# Patient Record
Sex: MALE | Race: OTHER | URBAN - METROPOLITAN AREA
[De-identification: names, ages, dates, MRNs, and addresses within clinical notes are randomized per-mention and may not be internally consistent; named-entity substitution may affect disease eponyms.]

---

## 2020-08-03 ENCOUNTER — EMERGENCY (EMERGENCY)
Facility: HOSPITAL | Age: 40
LOS: 1 days | Discharge: HOME | End: 2020-08-03
Attending: EMERGENCY MEDICINE | Admitting: EMERGENCY MEDICINE
Payer: MEDICAID

## 2020-08-03 VITALS
SYSTOLIC BLOOD PRESSURE: 128 MMHG | RESPIRATION RATE: 18 BRPM | OXYGEN SATURATION: 100 % | WEIGHT: 199.96 LBS | DIASTOLIC BLOOD PRESSURE: 79 MMHG | TEMPERATURE: 98 F | HEART RATE: 63 BPM

## 2020-08-03 VITALS
SYSTOLIC BLOOD PRESSURE: 130 MMHG | DIASTOLIC BLOOD PRESSURE: 80 MMHG | HEART RATE: 77 BPM | OXYGEN SATURATION: 100 % | RESPIRATION RATE: 18 BRPM | TEMPERATURE: 98 F

## 2020-08-03 LAB
ALBUMIN SERPL ELPH-MCNC: 4.3 G/DL — SIGNIFICANT CHANGE UP (ref 3.3–5)
ALP SERPL-CCNC: 71 U/L — SIGNIFICANT CHANGE UP (ref 40–120)
ALT FLD-CCNC: 11 U/L — SIGNIFICANT CHANGE UP (ref 10–45)
ANION GAP SERPL CALC-SCNC: 7 MMOL/L — SIGNIFICANT CHANGE UP (ref 5–17)
AST SERPL-CCNC: 17 U/L — SIGNIFICANT CHANGE UP (ref 10–40)
BASOPHILS # BLD AUTO: 0.04 K/UL — SIGNIFICANT CHANGE UP (ref 0–0.2)
BASOPHILS NFR BLD AUTO: 0.6 % — SIGNIFICANT CHANGE UP (ref 0–2)
BILIRUB SERPL-MCNC: 0.3 MG/DL — SIGNIFICANT CHANGE UP (ref 0.2–1.2)
BUN SERPL-MCNC: 9 MG/DL — SIGNIFICANT CHANGE UP (ref 7–23)
CALCIUM SERPL-MCNC: 9.3 MG/DL — SIGNIFICANT CHANGE UP (ref 8.4–10.5)
CHLORIDE SERPL-SCNC: 104 MMOL/L — SIGNIFICANT CHANGE UP (ref 96–108)
CO2 SERPL-SCNC: 30 MMOL/L — SIGNIFICANT CHANGE UP (ref 22–31)
CREAT SERPL-MCNC: 1 MG/DL — SIGNIFICANT CHANGE UP (ref 0.5–1.3)
EOSINOPHIL # BLD AUTO: 0.12 K/UL — SIGNIFICANT CHANGE UP (ref 0–0.5)
EOSINOPHIL NFR BLD AUTO: 1.7 % — SIGNIFICANT CHANGE UP (ref 0–6)
GLUCOSE SERPL-MCNC: 81 MG/DL — SIGNIFICANT CHANGE UP (ref 70–99)
HCT VFR BLD CALC: 44.3 % — SIGNIFICANT CHANGE UP (ref 39–50)
HGB BLD-MCNC: 14.4 G/DL — SIGNIFICANT CHANGE UP (ref 13–17)
IMM GRANULOCYTES NFR BLD AUTO: 0.1 % — SIGNIFICANT CHANGE UP (ref 0–1.5)
LYMPHOCYTES # BLD AUTO: 1.87 K/UL — SIGNIFICANT CHANGE UP (ref 1–3.3)
LYMPHOCYTES # BLD AUTO: 25.9 % — SIGNIFICANT CHANGE UP (ref 13–44)
MCHC RBC-ENTMCNC: 29.9 PG — SIGNIFICANT CHANGE UP (ref 27–34)
MCHC RBC-ENTMCNC: 32.5 GM/DL — SIGNIFICANT CHANGE UP (ref 32–36)
MCV RBC AUTO: 92.1 FL — SIGNIFICANT CHANGE UP (ref 80–100)
MONOCYTES # BLD AUTO: 0.63 K/UL — SIGNIFICANT CHANGE UP (ref 0–0.9)
MONOCYTES NFR BLD AUTO: 8.7 % — SIGNIFICANT CHANGE UP (ref 2–14)
NEUTROPHILS # BLD AUTO: 4.55 K/UL — SIGNIFICANT CHANGE UP (ref 1.8–7.4)
NEUTROPHILS NFR BLD AUTO: 63 % — SIGNIFICANT CHANGE UP (ref 43–77)
NRBC # BLD: 0 /100 WBCS — SIGNIFICANT CHANGE UP (ref 0–0)
PLATELET # BLD AUTO: 287 K/UL — SIGNIFICANT CHANGE UP (ref 150–400)
POTASSIUM SERPL-MCNC: 4.4 MMOL/L — SIGNIFICANT CHANGE UP (ref 3.5–5.3)
POTASSIUM SERPL-SCNC: 4.4 MMOL/L — SIGNIFICANT CHANGE UP (ref 3.5–5.3)
PROT SERPL-MCNC: 7.4 G/DL — SIGNIFICANT CHANGE UP (ref 6–8.3)
RBC # BLD: 4.81 M/UL — SIGNIFICANT CHANGE UP (ref 4.2–5.8)
RBC # FLD: 13.3 % — SIGNIFICANT CHANGE UP (ref 10.3–14.5)
SODIUM SERPL-SCNC: 141 MMOL/L — SIGNIFICANT CHANGE UP (ref 135–145)
WBC # BLD: 7.22 K/UL — SIGNIFICANT CHANGE UP (ref 3.8–10.5)
WBC # FLD AUTO: 7.22 K/UL — SIGNIFICANT CHANGE UP (ref 3.8–10.5)

## 2020-08-03 PROCEDURE — 80053 COMPREHEN METABOLIC PANEL: CPT

## 2020-08-03 PROCEDURE — 99284 EMERGENCY DEPT VISIT MOD MDM: CPT | Mod: 25

## 2020-08-03 PROCEDURE — 96375 TX/PRO/DX INJ NEW DRUG ADDON: CPT

## 2020-08-03 PROCEDURE — 36415 COLL VENOUS BLD VENIPUNCTURE: CPT

## 2020-08-03 PROCEDURE — 96374 THER/PROPH/DIAG INJ IV PUSH: CPT

## 2020-08-03 PROCEDURE — 85025 COMPLETE CBC W/AUTO DIFF WBC: CPT

## 2020-08-03 PROCEDURE — 99284 EMERGENCY DEPT VISIT MOD MDM: CPT

## 2020-08-03 RX ORDER — METOCLOPRAMIDE HCL 10 MG
10 TABLET ORAL ONCE
Refills: 0 | Status: COMPLETED | OUTPATIENT
Start: 2020-08-03 | End: 2020-08-03

## 2020-08-03 RX ORDER — KETOROLAC TROMETHAMINE 30 MG/ML
15 SYRINGE (ML) INJECTION ONCE
Refills: 0 | Status: DISCONTINUED | OUTPATIENT
Start: 2020-08-03 | End: 2020-08-03

## 2020-08-03 RX ORDER — SODIUM CHLORIDE 9 MG/ML
1000 INJECTION INTRAMUSCULAR; INTRAVENOUS; SUBCUTANEOUS ONCE
Refills: 0 | Status: COMPLETED | OUTPATIENT
Start: 2020-08-03 | End: 2020-08-03

## 2020-08-03 RX ADMIN — SODIUM CHLORIDE 1000 MILLILITER(S): 9 INJECTION INTRAMUSCULAR; INTRAVENOUS; SUBCUTANEOUS at 12:21

## 2020-08-03 RX ADMIN — Medication 15 MILLIGRAM(S): at 12:21

## 2020-08-03 RX ADMIN — Medication 10 MILLIGRAM(S): at 12:21

## 2020-08-03 NOTE — ED PROVIDER NOTE - NSFOLLOWUPINSTRUCTIONS_ED_ALL_ED_FT
Migraine Headache  A migraine headache is a very strong throbbing pain on one side or both sides of your head. This type of headache can also cause other symptoms. It can last from 4 hours to 3 days. Talk with your doctor about what things may bring on (trigger) this condition.  What are the causes?  The exact cause of this condition is not known. This condition may be triggered or caused by:  Drinking alcohol.Smoking.Taking medicines, such as:  Medicine used to treat chest pain (nitroglycerin).Birth control pills.Estrogen.Some blood pressure medicines.Eating or drinking certain products.Doing physical activity.Other things that may trigger a migraine headache include:  Having a menstrual period.Pregnancy.Hunger.Stress.Not getting enough sleep or getting too much sleep.Weather changes.Tiredness (fatigue).What increases the risk?  Being 25–55 years old.Being female.Having a family history of migraine headaches.Being .Having depression or anxiety.Being very overweight.What are the signs or symptoms?  A throbbing pain. This pain may:  Happen in any area of the head, such as on one side or both sides.Make it hard to do daily activities.Get worse with physical activity.Get worse around bright lights or loud noises.Other symptoms may include:  Feeling sick to your stomach (nauseous).Vomiting.Dizziness.Being sensitive to bright lights, loud noises, or smells.Before you get a migraine headache, you may get warning signs (an aura). An aura may include:  Seeing flashing lights or having blind spots.Seeing bright spots, halos, or zigzag lines.Having tunnel vision or blurred vision.Having numbness or a tingling feeling.Having trouble talking.Having weak muscles.Some people have symptoms after a migraine headache (postdromal phase), such as:  Tiredness.Trouble thinking (concentrating).How is this treated?  Taking medicines that:  Relieve pain.Relieve the feeling of being sick to your stomach.Prevent migraine headaches.Treatment may also include:  Having acupuncture.Avoiding foods that bring on migraine headaches.Learning ways to control your body functions (biofeedback).Therapy to help you know and deal with negative thoughts (cognitive behavioral therapy).Follow these instructions at home:  Medicines     Take over-the-counter and prescription medicines only as told by your doctor.Ask your doctor if the medicine prescribed to you:  Requires you to avoid driving or using heavy machinery.Can cause trouble pooping (constipation). You may need to take these steps to prevent or treat trouble pooping:  Drink enough fluid to keep your pee (urine) pale yellow.Take over-the-counter or prescription medicines.Eat foods that are high in fiber. These include beans, whole grains, and fresh fruits and vegetables.Limit foods that are high in fat and sugar. These include fried or sweet foods.Lifestyle     Do not drink alcohol.Do not use any products that contain nicotine or tobacco, such as cigarettes, e-cigarettes, and chewing tobacco. If you need help quitting, ask your doctor.Get at least 8 hours of sleep every night.Limit and deal with stress.General instructions         Keep a journal to find out what may bring on your migraine headaches. For example, write down:  What you eat and drink.How much sleep you get.Any change in what you eat or drink.Any change in your medicines.If you have a migraine headache:  Avoid things that make your symptoms worse, such as bright lights.It may help to lie down in a dark, quiet room.Do not drive or use heavy machinery.Ask your doctor what activities are safe for you.Keep all follow-up visits as told by your doctor. This is important.Contact a doctor if:  You get a migraine headache that is different or worse than others you have had.You have more than 15 headache days in one month.Get help right away if:  Your migraine headache gets very bad.Your migraine headache lasts longer than 72 hours.You have a fever.You have a stiff neck.You have trouble seeing.Your muscles feel weak or like you cannot control them.You start to lose your balance a lot.You start to have trouble walking.You pass out (faint).You have a seizure.Summary  A migraine headache is a very strong throbbing pain on one side or both sides of your head. These headaches can also cause other symptoms.This condition may be treated with medicines and changes to your lifestyle.Keep a journal to find out what may bring on your migraine headaches.Contact a doctor if you get a migraine headache that is different or worse than others you have had.Contact your doctor if you have more than 15 headache days in a month.This information is not intended to replace advice given to you by your health care provider. Make sure you discuss any questions you have with your health care provider.

## 2020-08-03 NOTE — ED PROVIDER NOTE - PHYSICAL EXAMINATION
Vitals reviewed  Gen: well appearing, nad, speaking in full sentences  Skin: wwp, no rash/lesions  HEENT: ncat, eomi, perrla, no nystagmus, mmm  Neck: supple, no ttp, no meningeal sxs  CV: rrr, no audible m/r/g  Resp: symmetrical expansion, ctab, no w/r/r  Abd: nondistended, soft, nontender, no rebound/guarding   Ext: FROM throughout, no peripheral edema  Neuro: alert/oriented, no focal deficits, steady gait, 5/5 strength in all ext, no ataxia

## 2020-08-03 NOTE — ED ADULT NURSE NOTE - OBJECTIVE STATEMENT
39yo male co cluster headache with associated nausea/vomiting since 7am. Pt states pain is often triggered by certain light such as sunlight. Currently, pt denies pain. Denies bloody emesis, CP, SOB, numbness/tingling, change in vision, change in LOC, diarrhea, fevers/chills. 41yo male co cluster headache with associated nausea/vomiting since 7am. Pt states pain is often triggered by certain light such as sunlight. Currently, pt denies pain. Denies bloody emesis, CP, SOB, numbness/tingling, change in vision, change in LOC, diarrhea, fevers/chills. neck pain/stiffness, dizziness.

## 2020-08-03 NOTE — ED PROVIDER NOTE - ATTENDING CONTRIBUTION TO CARE
patient presenting to ED for eval of HA x 1 week. right sided/throbbing. no blurry vision or focal nuero defects, f/c/d/cp/sob/weakness. endorses n/v/photphobia/phonophobia. no hx of thunderclap HA, symtoms progressed slowly over 1 week.  symptoms relieved with HA cocktail. VSS. fu nuero. return precautions discussed

## 2020-08-03 NOTE — ED PROVIDER NOTE - OBJECTIVE STATEMENT
40 M pmh seizures (last seizure 10+ yrs ago) p/w R sided headache x one week.  Pt reports dull/throbbing intermittent R sided headache for the past week, came back this morning w/ associated photophobia and one episode nbnb emesis.  He took motrin/tylenol without relief over past week, occasionally gets better 40 M pmh seizures (last seizure 10+ yrs ago) p/w R sided headache x one week.  Pt reports dull/throbbing intermittent R sided headache for the past week, came back this morning w/ associated photophobia and one episode nbnb emesis.  He took motrin/tylenol without relief over past week, occasionally gets better spontaneously but worse this morning.  Denies f/c, neck pain/stiffness, dizziness, fainting, double/blurry vision, seizure activity, chest pain, sob, uri sxs, abd pain, diarrhea, urinary sxs, numbness/weakness, trauma/fall

## 2020-08-03 NOTE — ED ADULT TRIAGE NOTE - LOCATION:
Left arm; “You can access the Follow[facility] Patient Portal, offered by , by registering with the following website: [email or web address]”

## 2020-08-03 NOTE — ED PROVIDER NOTE - CLINICAL SUMMARY MEDICAL DECISION MAKING FREE TEXT BOX
40 M pmh seizures (last seizure 10+ yrs ago) p/w atraumatic R sided headache x one week; today associated with n/v and photophobia, no dizziness/double or blurry vision.  pt well appearing, perrla, no neuro deficits on exam;  likely migraine vs tension headache.  will obtain labs and give IVF, toradol/reglan and reassess 40 M pmh seizures (last seizure 10+ yrs ago) p/w atraumatic R sided headache x one week; today associated with n/v and photophobia, no dizziness/double or blurry vision.  pt well appearing, perrla, no neuro deficits on exam;  likely migraine vs tension headache.  will obtain labs and give IVF, toradol/reglan and reassess  Pt feeling much better after meds, tolerating PO. labs unremarkable.  encouraged to continue PO hydration and take OTC analgesia.  has outpt neuro which he follows with for his seizures, instructed to make appointment.  discussed strict return parameters.  d/w attending

## 2020-08-03 NOTE — ED ADULT NURSE NOTE - NSIMPLEMENTINTERV_GEN_ALL_ED
Implemented All Universal Safety Interventions:  [place] to call system. Call bell, personal items and telephone within reach. Instruct patient to call for assistance. Room bathroom lighting operational. Non-slip footwear when patient is off stretcher. Physically safe environment: no spills, clutter or unnecessary equipment. Stretcher in lowest position, wheels locked, appropriate side rails in place.

## 2020-08-03 NOTE — ED PROVIDER NOTE - PATIENT PORTAL LINK FT
You can access the FollowMyHealth Patient Portal offered by [facility] by registering at the following website: [email or web address]. By joining [employer]’s FollowMyHealth portal, you will also be able to view your health information using other applications (apps) compatible with our system.

## 2020-08-07 DIAGNOSIS — R51 HEADACHE: ICD-10-CM

## 2021-08-16 NOTE — ED ADULT NURSE NOTE - NSFALLRSKASSESASSIST_ED_ALL_ED
Quality 110: Preventive Care And Screening: Influenza Immunization: Influenza Immunization not Administered for Documented Reasons.
Additional Notes: Deferred
Detail Level: Detailed
no